# Patient Record
Sex: MALE | Race: WHITE | NOT HISPANIC OR LATINO | URBAN - METROPOLITAN AREA
[De-identification: names, ages, dates, MRNs, and addresses within clinical notes are randomized per-mention and may not be internally consistent; named-entity substitution may affect disease eponyms.]

---

## 2020-09-10 ENCOUNTER — OUT OF OFFICE VISIT (OUTPATIENT)
Dept: URBAN - METROPOLITAN AREA MEDICAL CENTER 2 | Facility: MEDICAL CENTER | Age: 45
End: 2020-09-10
Payer: MEDICARE

## 2020-09-10 DIAGNOSIS — I27.20 PULMONARY HYPERTENSION: ICD-10-CM

## 2020-09-10 DIAGNOSIS — R10.13 ABDOMINAL DISCOMFORT, EPIGASTRIC: ICD-10-CM

## 2020-09-10 DIAGNOSIS — I50.9 ACUTE CONGESTIVE HEART FAILURE, UNSPECIFIED CONGESTIVE HEART FAILURE TYPE: ICD-10-CM

## 2020-09-10 DIAGNOSIS — R18.8 ABDOMINAL FLUID COLLECTION: ICD-10-CM

## 2020-09-10 DIAGNOSIS — R11.2 ACUTE NAUSEA WITH NONBILIOUS VOMITING: ICD-10-CM

## 2020-09-10 DIAGNOSIS — I00 RHEUMATIC FEVER: ICD-10-CM

## 2020-09-10 PROCEDURE — 99232 SBSQ HOSP IP/OBS MODERATE 35: CPT | Performed by: INTERNAL MEDICINE

## 2020-09-10 PROCEDURE — 99222 1ST HOSP IP/OBS MODERATE 55: CPT | Performed by: INTERNAL MEDICINE

## 2020-10-01 ENCOUNTER — OFFICE VISIT (OUTPATIENT)
Dept: URBAN - METROPOLITAN AREA CLINIC 113 | Facility: CLINIC | Age: 45
End: 2020-10-01

## 2020-10-01 NOTE — HPI-TODAY'S VISIT:
This is a 45-year-old male with a history of myocardial infarction with subsequent ischemic cardiomyopathy complicated by congestive heart failure, pulmonary hypertension, presenting for hospital follow-up. He was seen in consultation on 9/10/2020 regarding nausea, vomiting and ileus.  He was suspected to have a component of gastritis, and was recommended to begin pantoprazole 40 mg twice daily for complaints of epigastric burning.  He was also suspected to have a component of ascites with anasarca noted on his abdominal examination.  This was probably contributing to his GI complaints he was recommended an abdominal ultrasound to assess for degree of ascites with consideration of paracentesis pending course.  Abdominal ultrasound demonstrated moderate to large volume ascites.  This was felt to be secondary to his congestive heart failure and pulmonary hypertension.